# Patient Record
Sex: FEMALE | Race: WHITE | Employment: FULL TIME | URBAN - METROPOLITAN AREA
[De-identification: names, ages, dates, MRNs, and addresses within clinical notes are randomized per-mention and may not be internally consistent; named-entity substitution may affect disease eponyms.]

---

## 2022-07-18 ENCOUNTER — OFFICE VISIT (OUTPATIENT)
Dept: NEUROSURGERY | Age: 57
End: 2022-07-18
Payer: COMMERCIAL

## 2022-07-18 VITALS
TEMPERATURE: 97 F | BODY MASS INDEX: 34.44 KG/M2 | DIASTOLIC BLOOD PRESSURE: 84 MMHG | SYSTOLIC BLOOD PRESSURE: 132 MMHG | HEART RATE: 68 BPM | HEIGHT: 70 IN | OXYGEN SATURATION: 98 %

## 2022-07-18 DIAGNOSIS — H93.A2 PULSATILE TINNITUS OF LEFT EAR: Primary | ICD-10-CM

## 2022-07-18 PROCEDURE — 99244 OFF/OP CNSLTJ NEW/EST MOD 40: CPT | Performed by: PSYCHIATRY & NEUROLOGY

## 2022-07-18 RX ORDER — MONTELUKAST SODIUM 10 MG/1
10 TABLET ORAL DAILY
COMMUNITY

## 2022-07-18 NOTE — PATIENT INSTRUCTIONS
Follow up after imaging is complete. Learning About How Hospitals and Clinics Keep You Safe From COVID-19  Overview     Many hospitals and clinics now are treating people who are infected with COVID-19. So if you're in the hospital or clinic for any other reason, this may be an unsettling time. It's common to be concerned about becoming infected with the virus. But hospitals and clinics have policies to prevent the spread of infections. For example, doctors and nurses are trained to wash their hands before they treat you. Health care centers have stepped up these policies now. They are taking further steps to protect their patients. As long as SKZHT-93 remains a public health problem, things are going to be different when you go to a health care facility. They may have new rules for your safety. These could include having you wear a cloth face cover, meeting you outside the clinic, and having you sit away from others in the waiting room. What are hospitals and clinics doing to keep you safe? Your care team is very aware of the threat of COVID-19. They are doing everything they can to keep you safe. Hospitals and clinics may have different policies. But in general, you may expect many of these measures:  · You will be screened for COVID-19. When you come to the hospital, you may have your temperature taken. You'll be asked about any symptoms, such as a fever, a cough, or shortness of breath. You may be asked if you've had contact with anyone who's been diagnosed with the virus. And you may be asked if you've traveled to any place that has had an outbreak. · The staff may try to do as much as possible outside the facility. For example, you may be asked to fill out paperwork online or in your car before you come inside. The person giving you a ride home may be asked to wait outside.  These new rules to help protect you may make routine tasks take longer than usual.  · People who have COVID-19 are treated in a separate area. Many hospitals and clinics have staff members who treat only these patients. This helps limit the spread of the infection. · Visitors may be limited. In some cases, no visitors are allowed. In others, only one healthy visitor is allowed. Children may be limited to having only one adult with them. You can connect with family and friends using your phone or computer. If you need something brought from home, such as glasses or a phone , find out where the item can be dropped off. · The hospital or clinic follows guidelines to prevent infection. These include:  ? Washing hands often. ? Disinfecting high-touch surfaces. ? Wearing face masks or other protective equipment. ? Making extra space for social distancing. What can you do to stay safe? We all have a role to play in keeping ourselves safe and preventing the spread of COVID-19. Here are some things you can do while you're receiving care. If you're in a hospital, stay in your room. This will limit your exposure to the virus. It may be boring, but it's the safest place for you. Wash your hands often. Use soap and water, and scrub for 20 seconds. Then rinse and dry them well. Always wash them after you use the bathroom, before you eat, and after you cough, sneeze, or blow your nose. If you can't wash your hands, use hand . Speak up if you have safety concerns. Don't be shy to correct health care workers if they aren't washing their hands properly, wearing face masks, or taking other precautions. These actions are vital to prevent the spread of infection. Try to be understanding. This is a stressful time for everyone, including your care team. They are doing their best to keep you safe and provide you with good care. Where can you learn more?   Go to http://www.gray.com/  Enter A134 in the search box to learn more about \"Learning About How Hospitals and Clinics Keep You Safe From COVID-19. \"  Current as of: March 26, 2021               Content Version: 13.2  © 8865-5401 [employer], [facility]. Care instructions adapted under license by [employer] (which disclaims liability or warranty for this information). If you have questions about a medical condition or this instruction, always ask your healthcare professional. [place] [address] any warranty or liability for your use of this information.

## 2022-07-18 NOTE — PROGRESS NOTES
NEUROINTERVENTIONAL SURGERY   New Patient Visit         CONSULT INFORMATION:  Date of Service: 2022  Consultation Requested by: Michele Hilliard    PATIENT IDENTIFICATION:  Admit Date: (Not on file)  Admission Diagnoses: No admission diagnoses are documented for this encounter. Patient Name: Real Stoll  : 1965      CC:PT    HISTORY OF PRESENT ILLNESS:  65 y. o. WHITE/NON- [6]female referred for a few months of left sided pulsatile tinnitus. No recent trauma. Started with possible ear infection had some fullness which resolved. Lasts all day. No HA or focal neuro sx. Activity can exacerbate. No alleviating factors. Past Medical History:   Diagnosis Date    Hypertension     Hypothyroid     Rheumatoid arteritis        No past surgical history on file. Current Outpatient Medications   Medication Sig    etanercept (ENBREL) 25 mg (1 mL) injection 25 mg by SubCUTAneous route once.  methotrexate (RHEUMATREX) 2.5 mg tablet Take 25 mg by mouth Every Friday.  metoprolol tartrate (LOPRESSOR) 50 mg tablet Take 50 mg by mouth two (2) times a day.  levothyroxine (SYNTHROID) 125 mcg tablet Take 150 mcg by mouth Daily (before breakfast).  folic acid (FOLVITE) 1 mg tablet Take 3 mg by mouth daily.  aspirin (ASPIRIN) 325 mg tablet Take 325 mg by mouth daily.  oxyCODONE-acetaminophen (PERCOCET) 5-325 mg per tablet Take 1 Tab by mouth every four (4) hours as needed for Pain.  docusate sodium (COLACE) 50 mg capsule Take 50 mg by mouth two (2) times a day. No current facility-administered medications for this visit.        Allergies   Allergen Reactions    Neosporin + Pain Relief [Neomycin-Polymyxin-Pramoxine] Rash         Social History  Social History     Socioeconomic History    Marital status:      Spouse name: Not on file    Number of children: Not on file    Years of education: Not on file    Highest education level: Not on file   Occupational History    Not on file Tobacco Use    Smoking status: Former Smoker    Smokeless tobacco: Not on file   Substance and Sexual Activity    Alcohol use: Yes     Comment: social    Drug use: Not on file    Sexual activity: Not on file   Other Topics Concern    Not on file   Social History Narrative    Not on file     Social Determinants of Health     Financial Resource Strain:     Difficulty of Paying Living Expenses: Not on file   Food Insecurity:     Worried About Running Out of Food in the Last Year: Not on file    Melly of Food in the Last Year: Not on file   Transportation Needs:     Lack of Transportation (Medical): Not on file    Lack of Transportation (Non-Medical): Not on file   Physical Activity:     Days of Exercise per Week: Not on file    Minutes of Exercise per Session: Not on file   Stress:     Feeling of Stress : Not on file   Social Connections:     Frequency of Communication with Friends and Family: Not on file    Frequency of Social Gatherings with Friends and Family: Not on file    Attends [employer] Services: Not on file    Active Member of [address] [place] [address] or Organizations: Not on file    Attends Club or Organization Meetings: Not on file    Marital Status: Not on file   Intimate Partner Violence:     Fear of Current or Ex-Partner: Not on file    Emotionally Abused: Not on file    Physically Abused: Not on file    Sexually Abused: Not on file   Housing Stability:     Unable to Pay for Housing in the Last Year: Not on file    Number of [place] in the Last Year: Not on file    Unstable Housing in the Last Year: Not on file     Social History     Substance and Sexual Activity   Drug Use Not on file     No family history on file.       REVIEW OF SYSTEMS:  Neg except for above      OBJECTIVE:  Visit Vitals  /84 (BP 1 Location: Left upper arm, BP Patient Position: Sitting)   Pulse 68   Temp 97 °F (36.1 °C) (Temporal)   SpO2 98%       Physical Exam:   Gen: NAD  CV: nml s1,s2; no audible bruit  Neuro:  A/Ox3,speech/lang inact  CN 2-12 intact, perrl, fundoscopic exam nml, VF full  MAEx4 no drift; SILT, Parietal fxn intact  DTR sym  FTN intact        Lab Review:  Lab Results   Component Value Date    WBC 7.2 12/10/2016    HGB 11.8 12/10/2016    HCT 38.9 12/10/2016     12/10/2016     12/10/2016    K 4.4 12/10/2016     12/10/2016    CREA 0.76 12/10/2016    BUN 18 12/10/2016    CO2 30 12/10/2016       Imaging: my reads  MRA: nml  CUS: nml        IMPRESSION:  Pulsatile tinnitus, AS    PLAN:  - CT temp bones  - may eventually need DSA        SUBMITTED BY:  Signed By: Wendy Lynch DO   Neurointerventional Surgery  [facility]    Available via [address] [place] [address]    July 18, 2022

## 2022-07-29 ENCOUNTER — HOSPITAL ENCOUNTER (OUTPATIENT)
Dept: CT IMAGING | Age: 57
Discharge: HOME | End: 2022-07-29
Attending: PSYCHIATRY & NEUROLOGY
Payer: COMMERCIAL

## 2022-07-29 DIAGNOSIS — H93.A2 PULSATILE TINNITUS OF LEFT EAR: ICD-10-CM

## 2022-07-29 LAB — CREAT BLD-MCNC: 0.7 MG/DL (ref 0.6–1.3)

## 2022-07-29 PROCEDURE — 82565 ASSAY OF CREATININE: CPT

## 2022-07-29 PROCEDURE — 74011000636 HC RX REV CODE- 636: Performed by: PSYCHIATRY & NEUROLOGY

## 2022-07-29 PROCEDURE — 70481 CT ORBIT/EAR/FOSSA W/DYE: CPT

## 2022-07-29 RX ADMIN — IOPAMIDOL 100 ML: 612 INJECTION, SOLUTION INTRAVENOUS at 09:24

## 2022-08-16 ENCOUNTER — OFFICE VISIT (OUTPATIENT)
Dept: NEUROSURGERY | Age: 57
End: 2022-08-16
Payer: COMMERCIAL

## 2022-08-16 VITALS
WEIGHT: 203 LBS | DIASTOLIC BLOOD PRESSURE: 92 MMHG | OXYGEN SATURATION: 97 % | HEART RATE: 77 BPM | BODY MASS INDEX: 29.06 KG/M2 | TEMPERATURE: 97.9 F | HEIGHT: 70 IN | SYSTOLIC BLOOD PRESSURE: 140 MMHG

## 2022-08-16 DIAGNOSIS — H93.A9 PULSATILE TINNITUS: Primary | ICD-10-CM

## 2022-08-16 PROCEDURE — 99215 OFFICE O/P EST HI 40 MIN: CPT | Performed by: PSYCHIATRY & NEUROLOGY

## 2022-08-16 NOTE — PATIENT INSTRUCTIONS
Diagnostic Angiogram on 9/8/2022 at [address] time 8:00 am at Out Patient Registration.   Blood work will be needed at least one week prior to procedure at a [facility].  No eating or drinking after midnight the night prior to Angiogram and take all morning medications with sips of water the morning of the angiogram.

## 2022-08-16 NOTE — H&P (VIEW-ONLY)
NEUROINTERVENTIONAL SURGERY   Established Patient Visit         CONSULT INFORMATION:  Date of Service: 2022  Consultation Requested by: Christoph Barber    PATIENT IDENTIFICATION:  Patient Name: Jonel Pat  : 1965      IMPRESSION:  Pulsatile tinnitus, AS    PLAN:  - r/b/a d/w pt agrees to DSA to eval further      HISTORY OF PRESENT ILLNESS:  65 y. o. WHITE/NON- [6]female referred for a few months of left sided pulsatile tinnitus. No recent trauma. Started with possible ear infection had some fullness which resolved. Lasts all day. No HA or focal neuro sx. Activity can exacerbate. No alleviating factors. Interim: Still with left sided PT and quite bothersome. No new sx. Past Medical History:   Diagnosis Date    Anxiety     Fatigue     H/O knee surgery      right TKR    Hypertension     Hypothyroid     Migraine     Rheumatoid arteritis ([facility] [address]     S/P ankle ligament repair        History reviewed. No pertinent surgical history. Current Outpatient Medications   Medication Sig    escitalopram oxalate (LEXAPRO PO) Take  by mouth. Reports 50 mg daily    montelukast (SINGULAIR) 10 mg tablet Take 10 mg by mouth daily. etanercept (ENBREL) 25 mg (1 mL) injection 25 mg by SubCUTAneous route once. methotrexate (RHEUMATREX) 2.5 mg tablet Take 25 mg by mouth Every Friday. metoprolol tartrate (LOPRESSOR) 50 mg tablet Take 50 mg by mouth two (2) times a day. levothyroxine (SYNTHROID) 125 mcg tablet Take 150 mcg by mouth Daily (before breakfast). folic acid (FOLVITE) 1 mg tablet Take 3 mg by mouth daily. aspirin (ASPIRIN) 325 mg tablet Take 325 mg by mouth daily. (Patient not taking: No sig reported)    oxyCODONE-acetaminophen (PERCOCET) 5-325 mg per tablet Take 1 Tab by mouth every four (4) hours as needed for Pain. (Patient not taking: Reported on 2022)    docusate sodium (COLACE) 50 mg capsule Take 50 mg by mouth two (2) times a day.  (Patient not taking: No sig reported) No current facility-administered medications for this visit. Allergies   Allergen Reactions    Neosporin + Pain Relief [Neomycin-Polymyxin-Pramoxine] Rash         Social History  Social History     Socioeconomic History    Marital status:      Spouse name: Not on file    Number of children: Not on file    Years of education: Not on file    Highest education level: Not on file   Occupational History    Not on file   Tobacco Use    Smoking status: Former     Types: Cigarettes     Quit date:      Years since quittin.6    Smokeless tobacco: Never   Vaping Use    Vaping Use: Never used   Substance and Sexual Activity    Alcohol use:  Yes     Alcohol/week: 2.0 - 3.0 standard drinks     Types: 2 - 3 Glasses of wine per week     Comment: social    Drug use: Not on file    Sexual activity: Not on file   Other Topics Concern    Not on file   Social History Narrative    Not on file     Social Determinants of Health     Financial Resource Strain: Not on file   Food Insecurity: Not on file   Transportation Needs: Not on file   Physical Activity: Not on file   Stress: Not on file   Social Connections: Not on file   Intimate Partner Violence: Not on file   Housing Stability: Not on file     Social History     Substance and Sexual Activity   Drug Use Not on file     Family History   Problem Relation Age of Onset    Cancer Mother     Heart Disease Mother     Heart Disease Father     Other Father     Other Sister         Prottein C deficiency    Cancer Maternal Uncle     Heart Disease Paternal Aunt     Heart Disease Paternal Uncle     Cancer Maternal Grandmother     Heart Disease Paternal Grandmother     Heart Disease Paternal Grandfather          REVIEW OF SYSTEMS:  Neg except for above      OBJECTIVE:  Visit Vitals  BP (!) 140/92 (BP 1 Location: Left upper arm, BP Patient Position: Sitting)   Pulse 77   Temp 97.9 °F (36.6 °C) (Temporal)   Ht 5' 10\" (1.778 m)   Wt 243 lb (110.2 kg)   SpO2 97%   BMI 34.87 kg/m²       Physical Exam:   Gen: NAD  CV:  no audible bruit  Neuro:  A/Ox3,speech/lang inact  CN 2-12 intact, perrl  MAEx4 no drift; SILT  FTN intact        Lab Review:  Lab Results   Component Value Date    WBC 7.2 12/10/2016    HGB 11.8 12/10/2016    HCT 38.9 12/10/2016     12/10/2016     12/10/2016    K 4.4 12/10/2016     12/10/2016    CREA 0.76 12/10/2016    BUN 18 12/10/2016    CO2 30 12/10/2016       Imaging: my reads  MRA: nml  CUS: nml  CT temp: nml ?  High riding r ij        SUBMITTED BY:  Signed By: Serenity Orozco DO   Neurointerventional Surgery  [facility]    Available via [address] [place] [address]    August 16, 2022

## 2022-08-16 NOTE — PROGRESS NOTES
Follow up for imaging review. Reports no change in pulsatile tinnitus left ear. No acute problems reported.

## 2022-08-16 NOTE — PROGRESS NOTES
NEUROINTERVENTIONAL SURGERY   Established Patient Visit         CONSULT INFORMATION:  Date of Service: 2022  Consultation Requested by: Gisell Burrows    PATIENT IDENTIFICATION:  Patient Name: Elliott Parker  : 1965      IMPRESSION:  Pulsatile tinnitus, AS    PLAN:  - r/b/a d/w pt agrees to DSA to eval further      HISTORY OF PRESENT ILLNESS:  65 y. o. WHITE/NON- [6]female referred for a few months of left sided pulsatile tinnitus. No recent trauma. Started with possible ear infection had some fullness which resolved. Lasts all day. No HA or focal neuro sx. Activity can exacerbate. No alleviating factors. Interim: Still with left sided PT and quite bothersome. No new sx. Past Medical History:   Diagnosis Date    Anxiety     Fatigue     H/O knee surgery     2016 right TKR    Hypertension     Hypothyroid     Migraine     Rheumatoid arteritis ([facility] [address]     S/P ankle ligament repair        History reviewed. No pertinent surgical history. Current Outpatient Medications   Medication Sig    escitalopram oxalate (LEXAPRO PO) Take  by mouth. Reports 50 mg daily    montelukast (SINGULAIR) 10 mg tablet Take 10 mg by mouth daily. etanercept (ENBREL) 25 mg (1 mL) injection 25 mg by SubCUTAneous route once. methotrexate (RHEUMATREX) 2.5 mg tablet Take 25 mg by mouth Every Friday. metoprolol tartrate (LOPRESSOR) 50 mg tablet Take 50 mg by mouth two (2) times a day. levothyroxine (SYNTHROID) 125 mcg tablet Take 150 mcg by mouth Daily (before breakfast). folic acid (FOLVITE) 1 mg tablet Take 3 mg by mouth daily. aspirin (ASPIRIN) 325 mg tablet Take 325 mg by mouth daily. (Patient not taking: No sig reported)    oxyCODONE-acetaminophen (PERCOCET) 5-325 mg per tablet Take 1 Tab by mouth every four (4) hours as needed for Pain. (Patient not taking: Reported on 2022)    docusate sodium (COLACE) 50 mg capsule Take 50 mg by mouth two (2) times a day.  (Patient not taking: No sig reported) No current facility-administered medications for this visit. Allergies   Allergen Reactions    Neosporin + Pain Relief [Neomycin-Polymyxin-Pramoxine] Rash         Social History  Social History     Socioeconomic History    Marital status:      Spouse name: Not on file    Number of children: Not on file    Years of education: Not on file    Highest education level: Not on file   Occupational History    Not on file   Tobacco Use    Smoking status: Former     Types: Cigarettes     Quit date:      Years since quittin.6    Smokeless tobacco: Never   Vaping Use    Vaping Use: Never used   Substance and Sexual Activity    Alcohol use:  Yes     Alcohol/week: 2.0 - 3.0 standard drinks     Types: 2 - 3 Glasses of wine per week     Comment: social    Drug use: Not on file    Sexual activity: Not on file   Other Topics Concern    Not on file   Social History Narrative    Not on file     Social Determinants of Health     Financial Resource Strain: Not on file   Food Insecurity: Not on file   Transportation Needs: Not on file   Physical Activity: Not on file   Stress: Not on file   Social Connections: Not on file   Intimate Partner Violence: Not on file   Housing Stability: Not on file     Social History     Substance and Sexual Activity   Drug Use Not on file     Family History   Problem Relation Age of Onset    Cancer Mother     Heart Disease Mother     Heart Disease Father     Other Father     Other Sister         Prottein C deficiency    Cancer Maternal Uncle     Heart Disease Paternal Aunt     Heart Disease Paternal Uncle     Cancer Maternal Grandmother     Heart Disease Paternal Grandmother     Heart Disease Paternal Grandfather          REVIEW OF SYSTEMS:  Neg except for above      OBJECTIVE:  Visit Vitals  BP (!) 140/92 (BP 1 Location: Left upper arm, BP Patient Position: Sitting)   Pulse 77   Temp 97.9 °F (36.6 °C) (Temporal)   Ht 5' 10\" (1.778 m)   Wt 243 lb (110.2 kg)   SpO2 97%   BMI 34.87 kg/m²       Physical Exam:   Gen: NAD  CV:  no audible bruit  Neuro:  A/Ox3,speech/lang inact  CN 2-12 intact, perrl  MAEx4 no drift; SILT  FTN intact        Lab Review:  Lab Results   Component Value Date    WBC 7.2 12/10/2016    HGB 11.8 12/10/2016    HCT 38.9 12/10/2016     12/10/2016     12/10/2016    K 4.4 12/10/2016     12/10/2016    CREA 0.76 12/10/2016    BUN 18 12/10/2016    CO2 30 12/10/2016       Imaging: my reads  MRA: nml  CUS: nml  CT temp: nml ?  High riding r ij        SUBMITTED BY:  Signed By: Hillary Goldstein DO   Neurointerventional Surgery  [facility]    Available via [employer]    August 16, 2022

## 2022-09-01 LAB
ALBUMIN SERPL-MCNC: 4.5 G/DL (ref 3.8–4.9)
ALBUMIN/GLOB SERPL: 2 {RATIO} (ref 1.2–2.2)
ALP SERPL-CCNC: 57 IU/L (ref 44–121)
ALT SERPL-CCNC: 17 IU/L (ref 0–32)
AST SERPL-CCNC: 22 IU/L (ref 0–40)
BILIRUB SERPL-MCNC: 0.5 MG/DL (ref 0–1.2)
BUN SERPL-MCNC: 21 MG/DL (ref 6–24)
BUN/CREAT SERPL: 29 (ref 9–23)
CALCIUM SERPL-MCNC: 9.6 MG/DL (ref 8.7–10.2)
CHLORIDE SERPL-SCNC: 103 MMOL/L (ref 96–106)
CO2 SERPL-SCNC: 22 MMOL/L (ref 20–29)
CREAT SERPL-MCNC: 0.73 MG/DL (ref 0.57–1)
EGFR: 96 ML/MIN/1.73
ERYTHROCYTE [DISTWIDTH] IN BLOOD BY AUTOMATED COUNT: 13.6 % (ref 11.7–15.4)
GLOBULIN SER CALC-MCNC: 2.3 G/DL (ref 1.5–4.5)
GLUCOSE SERPL-MCNC: 89 MG/DL (ref 65–99)
HCT VFR BLD AUTO: 38.7 % (ref 34–46.6)
HGB BLD-MCNC: 13.8 G/DL (ref 11.1–15.9)
MCH RBC QN AUTO: 33 PG (ref 26.6–33)
MCHC RBC AUTO-ENTMCNC: 35.7 G/DL (ref 31.5–35.7)
MCV RBC AUTO: 93 FL (ref 79–97)
PLATELET # BLD AUTO: 293 X10E3/UL (ref 150–450)
POTASSIUM SERPL-SCNC: 4.5 MMOL/L (ref 3.5–5.2)
PROT SERPL-MCNC: 6.8 G/DL (ref 6–8.5)
RBC # BLD AUTO: 4.18 X10E6/UL (ref 3.77–5.28)
SODIUM SERPL-SCNC: 138 MMOL/L (ref 134–144)
WBC # BLD AUTO: 6.8 X10E3/UL (ref 3.4–10.8)

## 2022-09-07 ENCOUNTER — TELEPHONE (OUTPATIENT)
Dept: NEUROSURGERY | Age: 57
End: 2022-09-07

## 2022-09-07 NOTE — TELEPHONE ENCOUNTER
Message left for patient to confirm Diagnostic angiogram tomorrow at [facility]. Arrival time 8:00 am at Outpatient registration. No eating or drinking after midnight the night prior to Angiogram and take all morning medications with sips of water the morning of the angiogram.  Call office to confirm receipt of message.

## 2022-09-08 ENCOUNTER — HOSPITAL ENCOUNTER (OUTPATIENT)
Dept: INTERVENTIONAL RADIOLOGY/VASCULAR | Age: 57
Discharge: HOME | End: 2022-09-08
Attending: PSYCHIATRY & NEUROLOGY | Admitting: PSYCHIATRY & NEUROLOGY
Payer: COMMERCIAL

## 2022-09-08 VITALS
OXYGEN SATURATION: 98 % | SYSTOLIC BLOOD PRESSURE: 128 MMHG | RESPIRATION RATE: 17 BRPM | HEART RATE: 63 BPM | HEIGHT: 70 IN | BODY MASS INDEX: 29.06 KG/M2 | DIASTOLIC BLOOD PRESSURE: 97 MMHG | TEMPERATURE: 98.2 F | WEIGHT: 203 LBS

## 2022-09-08 DIAGNOSIS — H93.A9 PULSATILE TINNITUS: ICD-10-CM

## 2022-09-08 PROCEDURE — C1887 CATHETER, GUIDING: HCPCS

## 2022-09-08 PROCEDURE — 74011250637 HC RX REV CODE- 250/637: Performed by: PSYCHIATRY & NEUROLOGY

## 2022-09-08 PROCEDURE — 36224 PLACE CATH CAROTD ART: CPT | Performed by: PSYCHIATRY & NEUROLOGY

## 2022-09-08 PROCEDURE — 36224 PLACE CATH CAROTD ART: CPT

## 2022-09-08 PROCEDURE — 74011250636 HC RX REV CODE- 250/636: Performed by: PSYCHIATRY & NEUROLOGY

## 2022-09-08 PROCEDURE — 77030008584 HC TOOL GDWRE DEV TERU -A

## 2022-09-08 PROCEDURE — C1769 GUIDE WIRE: HCPCS

## 2022-09-08 PROCEDURE — C1894 INTRO/SHEATH, NON-LASER: HCPCS

## 2022-09-08 PROCEDURE — 74011000250 HC RX REV CODE- 250: Performed by: PSYCHIATRY & NEUROLOGY

## 2022-09-08 PROCEDURE — 2709999900 HC NON-CHARGEABLE SUPPLY

## 2022-09-08 PROCEDURE — 74011000636 HC RX REV CODE- 636: Performed by: PSYCHIATRY & NEUROLOGY

## 2022-09-08 PROCEDURE — 36226 PLACE CATH VERTEBRAL ART: CPT | Performed by: PSYCHIATRY & NEUROLOGY

## 2022-09-08 PROCEDURE — 77030003394 HC NDL ART COOK -A

## 2022-09-08 PROCEDURE — 74011000250 HC RX REV CODE- 250

## 2022-09-08 PROCEDURE — 76937 US GUIDE VASCULAR ACCESS: CPT | Performed by: PSYCHIATRY & NEUROLOGY

## 2022-09-08 PROCEDURE — 36227 PLACE CATH XTRNL CAROTID: CPT | Performed by: PSYCHIATRY & NEUROLOGY

## 2022-09-08 RX ORDER — MIDAZOLAM HYDROCHLORIDE 1 MG/ML
4 INJECTION, SOLUTION INTRAMUSCULAR; INTRAVENOUS
Status: DISCONTINUED | OUTPATIENT
Start: 2022-09-08 | End: 2022-09-08

## 2022-09-08 RX ORDER — FLUMAZENIL 0.1 MG/ML
0.5 INJECTION INTRAVENOUS
Status: DISCONTINUED | OUTPATIENT
Start: 2022-09-08 | End: 2022-09-08

## 2022-09-08 RX ORDER — FENTANYL CITRATE 50 UG/ML
200 INJECTION, SOLUTION INTRAMUSCULAR; INTRAVENOUS
Status: DISCONTINUED | OUTPATIENT
Start: 2022-09-08 | End: 2022-09-08

## 2022-09-08 RX ORDER — NALOXONE HYDROCHLORIDE 0.4 MG/ML
0.4 INJECTION, SOLUTION INTRAMUSCULAR; INTRAVENOUS; SUBCUTANEOUS
Status: DISCONTINUED | OUTPATIENT
Start: 2022-09-08 | End: 2022-09-08 | Stop reason: HOSPADM

## 2022-09-08 RX ORDER — VERAPAMIL HYDROCHLORIDE 2.5 MG/ML
2.5 INJECTION, SOLUTION INTRAVENOUS
Status: COMPLETED | OUTPATIENT
Start: 2022-09-08 | End: 2022-09-08

## 2022-09-08 RX ORDER — SODIUM CHLORIDE 9 MG/ML
25 INJECTION, SOLUTION INTRAVENOUS
Status: COMPLETED | OUTPATIENT
Start: 2022-09-08 | End: 2022-09-08

## 2022-09-08 RX ORDER — LIDOCAINE HYDROCHLORIDE 20 MG/ML
20 INJECTION, SOLUTION INFILTRATION; PERINEURAL
Status: COMPLETED | OUTPATIENT
Start: 2022-09-08 | End: 2022-09-08

## 2022-09-08 RX ORDER — SODIUM BICARBONATE 42 MG/ML
1 INJECTION, SOLUTION INTRAVENOUS
Status: COMPLETED | OUTPATIENT
Start: 2022-09-08 | End: 2022-09-08

## 2022-09-08 RX ORDER — VERAPAMIL HYDROCHLORIDE 2.5 MG/ML
INJECTION, SOLUTION INTRAVENOUS
Status: DISCONTINUED
Start: 2022-09-08 | End: 2022-09-08 | Stop reason: WASHOUT

## 2022-09-08 RX ORDER — HEPARIN SODIUM 1000 [USP'U]/ML
2000 INJECTION, SOLUTION INTRAVENOUS; SUBCUTANEOUS
Status: COMPLETED | OUTPATIENT
Start: 2022-09-08 | End: 2022-09-08

## 2022-09-08 RX ORDER — LIDOCAINE 40 MG/G
CREAM TOPICAL
Status: COMPLETED | OUTPATIENT
Start: 2022-09-08 | End: 2022-09-08

## 2022-09-08 RX ADMIN — NITROGLYCERIN 1 INCH: 20 OINTMENT TOPICAL at 09:25

## 2022-09-08 RX ADMIN — Medication 4000 UNITS: at 10:14

## 2022-09-08 RX ADMIN — IOPAMIDOL 61 ML: 612 INJECTION, SOLUTION INTRAVENOUS at 10:28

## 2022-09-08 RX ADMIN — NITROGLYCERIN 100 MCG: 10 INJECTION INTRAVENOUS at 10:14

## 2022-09-08 RX ADMIN — HEPARIN SODIUM 2000 UNITS: 1000 INJECTION INTRAVENOUS; SUBCUTANEOUS at 10:13

## 2022-09-08 RX ADMIN — LIDOCAINE 4%: 4 CREAM TOPICAL at 09:25

## 2022-09-08 RX ADMIN — VERAPAMIL HYDROCHLORIDE 2.5 MG: 2.5 INJECTION, SOLUTION INTRAVENOUS at 10:16

## 2022-09-08 RX ADMIN — SODIUM CHLORIDE 25 ML/HR: 9 INJECTION, SOLUTION INTRAVENOUS at 10:18

## 2022-09-08 RX ADMIN — Medication 4000 UNITS: at 10:11

## 2022-09-08 RX ADMIN — Medication 4000 UNITS: at 10:12

## 2022-09-08 RX ADMIN — SODIUM BICARBONATE 1 ML: 42 INJECTION, SOLUTION INTRAVENOUS at 10:14

## 2022-09-08 RX ADMIN — LIDOCAINE HYDROCHLORIDE 3 ML: 20 INJECTION, SOLUTION INFILTRATION; PERINEURAL at 10:17

## 2022-09-08 NOTE — DISCHARGE INSTRUCTIONS
Brain Angiogram: What to Expect at [address] brain angiogram (cerebral angiogram) is a test (also called a procedure) that looks for problems with blood vessels and blood flow in the brain. The doctor inserted a thin, flexible tube (catheter) into a blood vessel in your groin. Or the doctor may have put the catheter in a blood vessel in your arm. Then a dye was inserted into the catheter. The dye flowed into the blood vessel. The dye made the blood vessels show up on a video screen. You may have had this test to see if a blood vessel in the brain is bulging, narrowed, or blocked. The test may also be used to check other symptoms, such as unusual headaches, or to check problems found during a different test.  You may have a bruise where the catheter was put in, and you may feel sore for a few days. You can do light activities around the house. But don't do anything strenuous for several days. This care sheet gives you a general idea about how long it will take for you to recover. But each person recovers at a different pace. Follow the steps below to feel better as quickly as possible. How can you care for yourself at home? Activity    Do not do strenuous exercise and do not lift, pull, or push anything heavy for one week You can walk around the house and do light activity, such as cooking. If the catheter was placed in your arm near your wrist, do not bend your wrist deeply for the 3 days. Be careful using your hand to get into and out of a chair or bed. If your doctor recommends it, get more exercise. Walking is a good choice. Bit by bit, increase the amount you walk every day. Try for at least 30 minutes on most days of the week. Diet    Drink plenty of fluids to help your body flush out the dye. If you have kidney, heart, or liver disease and have to limit fluids, talk with your doctor before you increase the amount of fluids you drink.      Keep eating a heart-healthy diet that has lots of fruits, vegetables, and whole grains. If you need help with your diet, talk to your doctor. You also may want to talk to a dietitian. This expert can help you to learn about healthy foods and plan meals. Medicines    Your doctor will tell you if and when you can restart your medicines. You will also be given instructions about taking any new medicines. If you take aspirin or some other blood thinner, ask your doctor if and when to start taking it again. Make sure that you understand exactly what your doctor wants you to do. Be safe with medicines. Read and follow all instructions on the label. If the doctor gave you a prescription medicine for pain, take it as prescribed. If you are not taking a prescription pain medicine, ask your doctor if you can take an over-the-counter medicine. Care of the catheter site    For the first 3 days, keep a bandage over the spot where the catheter was put in. Put ice or a cold pack on the area for 10 to 20 minutes at a time. Try to do this every 1 to 2 hours for the next 3 days (when you are awake) or until the swelling goes down. Put a thin cloth between the ice and your skin. You may shower 24 to 48 hours after the procedure, if your doctor okays it. Pat the incision dry. Do not soak the catheter site until it is healed. Don't take a bath for 1 week, or until your doctor tells you it is okay. Watch for bleeding from the site. A small amount of blood (up to the size of a quarter) on the bandage can be normal.     If you are bleeding, lie down and press on the area for 15 minutes to try to make it stop. If the bleeding does not stop, call your doctor or seek immediate medical care. Follow-up care is a key part of your treatment and safety. Be sure to make and go to all appointments, and call your doctor if you are having problems. It's also a good idea to know your test results and keep a list of the medicines you take.   When should you call for help? Call 911 anytime you think you may need emergency care. For example, call if:    You passed out (lost consciousness). You have severe trouble breathing. You have sudden chest pain and shortness of breath, or you cough up blood. You have symptoms of a stroke. These may include:  Sudden numbness, tingling, weakness, or loss of movement in your face, arm, or leg, especially on only one side of your body. Sudden vision changes. Sudden trouble speaking. Sudden confusion or trouble understanding simple statements. Sudden problems with walking or balance. A sudden, severe headache that is different from past headaches. Call your doctor now or seek immediate medical care if:    You are bleeding from the area where the catheter was put in your artery. You have a fast-growing, painful lump at the catheter site. You have symptoms of infection, such as: Increased pain, swelling, warmth, or redness. Red streaks leading from the area. Pus draining from the area. A fever. Your leg, arm, or hand is painful, looks blue, or feels cold, numb, or tingly. Watch closely for any changes in your health, and be sure to contact your doctor if:    You are not getting better as expected. Where can you learn more? Go to http://www.gray.com/  Enter O249 in the search box to learn more about \"Brain Angiogram: What to Expect at Home. \"  Current as of: July 6, 2021               Content Version: 13.2  © 5705-3237 [employer]. Care instructions adapted under license by [employer] (which disclaims liability or warranty for this information). If you have questions about a medical condition or this instruction, always ask your healthcare professional. [place] [address] any warranty or liability for your use of this information.      Should you experience any of these significant changes, please call [phone] between the hours of 7:30 am and 3:30 pm or [phone] after hours. After hours, ask the  to page the X-ray Technologist, and describe the problem to the technologist. If you are experiencing chest pain, shortness of breath, altered mental state, unusual bleeding or any other emergent symptom you should call 911 immediately. [address] September 8, 2022       Rm Gallegos      To Whom It May Concern,    This is to certify that Rm Gallegos may may return to work on 9/12/2022. Please feel free to contact my office if you have any questions or concerns. Thank you for your assistance in this matter.       Sincerely,  SHAINA Lopez Ace, DO      9/8/2022

## 2022-09-08 NOTE — ROUTINE PROCESS
Pt arrives ambulatory to angio department accompanied by self for Diagnostic cerebral angiogram procedure. All assessments completed and consent was reviewed. Education given was regarding procedure, No sedation, post-procedure care and  management/follow-up. Opportunity for questions was provided and all questions and concerns were addressed. Pt does not have a . RN explained the risks of driving post procedure. RN explained to patient will be unable to receive sedation. Pt verbalize understanding and is agreeable to proceed. 1588: Dr. Alo Appiah notified pt does not have a  and is amendable to no sedation for procedure. Dr. Alo Appiah states its okay to proceed and Pt OK to drive post procedure.

## 2022-09-08 NOTE — BRIEF OP NOTE
NEUROINTERVENTIONAL SURGERY POST-PROCEDURE NOTE    PROCEDURE:  Cerebral Angiogram    VESSEL(S) STUDIED:  R vert  R ECA, ICA  L ECA, ICA VESSEL(S) TREATED:  N/a      PRELIMINARY REPORT & DISPOSITION:   Left TS diverticulum  High riding R IJ      See official report for details    COMPLICATIONS:  None    FOLLOW-UP:  clinic DATE OF SERVICE:  9/8/2022 10:44 AM     ATTENDING SURGEON:  FIGUEROA Day DO    ANESTHESIA:   local    MEDICATIONS:   See nursing record    PUNCTURE SITE:  R radial-->vasc band           Signed By: Cyndee Pond DO   Neurointerventional Surgery  [facility]    September 8, 2022

## 2022-09-08 NOTE — INTERVAL H&P NOTE
Update History & Physical    The Patient's recent History and Physical was reviewed with the patient and I examined the patient. There was no change. Plan:  The risk, benefits, expected outcome, and alternative to the recommended procedure have been discussed with the patient. Patient understands and wants to proceed with the procedure.     Electronically signed by Serenity Orozco DO on 9/8/2022 at 8:04 AM

## 2022-09-08 NOTE — PROGRESS NOTES
Patient given copy of discharge instructions and verbalized understanding. Patient walked to exit with RN. Patient in NAD. No bleeding noted at puncture site.

## 2022-09-22 ENCOUNTER — OFFICE VISIT (OUTPATIENT)
Dept: NEUROSURGERY | Age: 57
End: 2022-09-22
Payer: COMMERCIAL

## 2022-09-22 DIAGNOSIS — H93.A9 PULSATILE TINNITUS: Primary | ICD-10-CM

## 2022-09-22 PROCEDURE — 99213 OFFICE O/P EST LOW 20 MIN: CPT | Performed by: PSYCHIATRY & NEUROLOGY

## 2022-09-22 NOTE — PROGRESS NOTES
NEUROINTERVENTIONAL SURGERY   Established Patient Visit         CONSULT INFORMATION:  Date of Service: 2022  Consultation Requested by: Karoline Pollack    PATIENT IDENTIFICATION:  Patient Name: Patel Díaz  : 1965      IMPRESSION:  Pulsatile tinnitus, AS    PLAN:  - Low suspicion the left transverse sinus diverticulum is the cause as it is small and posterior. R/b/a d/w pt and she agrees to conservative management  - follow up prn      HISTORY OF PRESENT ILLNESS:  65 y. o. WHITE/NON- [6]female referred for a few months of left sided pulsatile tinnitus. No recent trauma. Started with possible ear infection had some fullness which resolved. Lasts all day. No HA or focal neuro sx. Activity can exacerbate. No alleviating factors. Interim: Still with left sided PT. No new sx after DSA       Past Medical History:   Diagnosis Date    Anxiety     Fatigue     H/O knee surgery     2016 right TKR    Hypertension     Hypothyroid     Migraine     Rheumatoid arteritis ([facility] [address]     S/P ankle ligament repair        No past surgical history on file. Current Outpatient Medications   Medication Sig    escitalopram oxalate (LEXAPRO PO) Take  by mouth. Reports 50 mg daily    montelukast (SINGULAIR) 10 mg tablet Take 10 mg by mouth daily. (Patient not taking: Reported on 2022)    etanercept (ENBREL) 25 mg (1 mL) injection 25 mg by SubCUTAneous route once. methotrexate (RHEUMATREX) 2.5 mg tablet Take 25 mg by mouth Every Friday. metoprolol tartrate (LOPRESSOR) 50 mg tablet Take 50 mg by mouth two (2) times a day. levothyroxine (SYNTHROID) 125 mcg tablet Take 150 mcg by mouth Daily (before breakfast). folic acid (FOLVITE) 1 mg tablet Take 3 mg by mouth daily. No current facility-administered medications for this visit.        Allergies   Allergen Reactions    Neosporin + Pain Relief [Neomycin-Polymyxin-Pramoxine] Rash         Social History  Social History     Socioeconomic History    Marital status:      Spouse name: Not on file    Number of children: Not on file    Years of education: Not on file    Highest education level: Not on file   Occupational History    Not on file   Tobacco Use    Smoking status: Former     Types: Cigarettes     Quit date:  [address]     Years since quittin.7    Smokeless tobacco: Never   Vaping Use    Vaping Use: Never used   Substance and Sexual Activity    Alcohol use: Yes     Alcohol/week: 2.0 - 3.0 standard drinks     Types: 2 - 3 Glasses of wine per week     Comment: social    Drug use: Not on file    Sexual activity: Not on file   Other Topics Concern    Not on file   Social History Narrative    Not on file     Social Determinants of Health     Financial Resource Strain: Not on file   Food Insecurity: Not on file   Transportation Needs: Not on file   Physical Activity: Not on file   Stress: Not on file   Social Connections: Not on file   Intimate Partner Violence: Not on file   Housing Stability: Not on file     Social History     Substance and Sexual Activity   Drug Use Not on file     Family History   Problem Relation Age of Onset    Cancer Mother     Heart Disease Mother     Heart Disease Father     Other Father     Other Sister         Prottein C deficiency    Cancer Maternal Uncle     Heart Disease Paternal Aunt     Heart Disease Paternal Uncle     Cancer Maternal Grandmother     Heart Disease Paternal Grandmother     Heart Disease Paternal Grandfather          REVIEW OF SYSTEMS:  Neg except for above      OBJECTIVE:  There were no vitals taken for this visit.       Physical Exam:   Gen: NAD  CV:  no audible bruit  Neuro:  A/Ox3,speech/lang inact  CN 2-12 intact, perrl  MAEx4 no drift; SILT  FTN intact        Lab Review:  Lab Results   Component Value Date    WBC 6.8 2022    HGB 13.8 2022    HCT 38.7 2022     2022    ALT 17 2022    AST 22 2022     2022    K 4.5 2022     2022    CREA 0.73 08/31/2022    BUN 21 08/31/2022    CO2 22 08/31/2022       Imaging: my reads  MRA: nml  CUS: nml  CT temp: nml ?  High riding r ij  DSA:  L TS diverticulum      SUBMITTED BY:  Signed By: Wendy Lynch DO   Neurointerventional Surgery  [facility]    Available via [address] [place] [address]    September 22, 2022